# Patient Record
Sex: MALE | Race: WHITE | Employment: STUDENT | URBAN - METROPOLITAN AREA
[De-identification: names, ages, dates, MRNs, and addresses within clinical notes are randomized per-mention and may not be internally consistent; named-entity substitution may affect disease eponyms.]

---
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
## 2022-07-26 ENCOUNTER — HOSPITAL ENCOUNTER (OUTPATIENT)
Age: 22
Discharge: HOME | End: 2022-07-26
Attending: EMERGENCY MEDICINE
Payer: MEDICAID

## 2022-07-26 VITALS
RESPIRATION RATE: 18 BRPM | WEIGHT: 190 LBS | OXYGEN SATURATION: 99 % | DIASTOLIC BLOOD PRESSURE: 88 MMHG | BODY MASS INDEX: 28.79 KG/M2 | HEIGHT: 68 IN | HEART RATE: 82 BPM | SYSTOLIC BLOOD PRESSURE: 137 MMHG | TEMPERATURE: 98 F
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
## 2022-07-26 PROCEDURE — 99202 OFFICE O/P NEW SF 15 MIN: CPT

## 2022-07-26 NOTE — ED INITIAL ASSESSMENT (HPI)
Pt aox4. Pt accompanied by friend. Pt c/o medial knee pain x 1 month. Pt states pain increased yesterday. Pt denies injury. Pt states when straightens rt knee pain increases. Upon RN assessment no swelling noted rt medial knee.